# Patient Record
Sex: FEMALE | Race: WHITE | Employment: FULL TIME | URBAN - METROPOLITAN AREA
[De-identification: names, ages, dates, MRNs, and addresses within clinical notes are randomized per-mention and may not be internally consistent; named-entity substitution may affect disease eponyms.]

---

## 2020-03-10 ENCOUNTER — HOSPITAL ENCOUNTER (INPATIENT)
Age: 30
LOS: 2 days | Discharge: HOME | DRG: 885 | End: 2020-03-12
Attending: EMERGENCY MEDICINE | Admitting: PSYCHIATRY & NEUROLOGY
Payer: COMMERCIAL

## 2020-03-10 PROCEDURE — 6370000000 HC RX 637 (ALT 250 FOR IP): Performed by: NURSE PRACTITIONER

## 2020-03-10 PROCEDURE — 6370000000 HC RX 637 (ALT 250 FOR IP): Performed by: PSYCHIATRY & NEUROLOGY

## 2020-03-10 PROCEDURE — 1240000000 HC EMOTIONAL WELLNESS R&B

## 2020-03-10 PROCEDURE — 99285 EMERGENCY DEPT VISIT HI MDM: CPT

## 2020-03-10 RX ORDER — OMEGA-3S/DHA/EPA/FISH OIL/D3 300MG-1000
400 CAPSULE ORAL DAILY
COMMUNITY

## 2020-03-10 RX ORDER — ACETAMINOPHEN 325 MG/1
650 TABLET ORAL EVERY 4 HOURS PRN
Status: DISCONTINUED | OUTPATIENT
Start: 2020-03-10 | End: 2020-03-12 | Stop reason: HOSPADM

## 2020-03-10 RX ORDER — CETIRIZINE HYDROCHLORIDE 10 MG/1
10 TABLET ORAL DAILY PRN
Status: DISCONTINUED | OUTPATIENT
Start: 2020-03-10 | End: 2020-03-12 | Stop reason: HOSPADM

## 2020-03-10 RX ORDER — BENZTROPINE MESYLATE 1 MG/ML
2 INJECTION INTRAMUSCULAR; INTRAVENOUS 2 TIMES DAILY PRN
Status: DISCONTINUED | OUTPATIENT
Start: 2020-03-10 | End: 2020-03-12 | Stop reason: HOSPADM

## 2020-03-10 RX ORDER — MAGNESIUM HYDROXIDE/ALUMINUM HYDROXICE/SIMETHICONE 120; 1200; 1200 MG/30ML; MG/30ML; MG/30ML
30 SUSPENSION ORAL EVERY 6 HOURS PRN
Status: DISCONTINUED | OUTPATIENT
Start: 2020-03-10 | End: 2020-03-12 | Stop reason: HOSPADM

## 2020-03-10 RX ORDER — ALPRAZOLAM 0.5 MG/1
0.5 TABLET ORAL 3 TIMES DAILY PRN
Status: ON HOLD | COMMUNITY
End: 2020-03-12 | Stop reason: HOSPADM

## 2020-03-10 RX ORDER — HYDROXYZINE HYDROCHLORIDE 25 MG/1
25 TABLET, FILM COATED ORAL 3 TIMES DAILY PRN
Status: DISCONTINUED | OUTPATIENT
Start: 2020-03-10 | End: 2020-03-11

## 2020-03-10 RX ORDER — CETIRIZINE HYDROCHLORIDE 10 MG/1
10 TABLET ORAL DAILY PRN
COMMUNITY

## 2020-03-10 RX ORDER — TRAZODONE HYDROCHLORIDE 50 MG/1
50 TABLET ORAL NIGHTLY PRN
Status: DISCONTINUED | OUTPATIENT
Start: 2020-03-10 | End: 2020-03-12 | Stop reason: HOSPADM

## 2020-03-10 RX ORDER — OMEGA-3S/DHA/EPA/FISH OIL/D3 300MG-1000
400 CAPSULE ORAL DAILY
Status: DISCONTINUED | OUTPATIENT
Start: 2020-03-10 | End: 2020-03-12 | Stop reason: HOSPADM

## 2020-03-10 RX ORDER — LEVOTHYROXINE AND LIOTHYRONINE 19; 4.5 UG/1; UG/1
30 TABLET ORAL DAILY
COMMUNITY

## 2020-03-10 RX ORDER — ALPRAZOLAM 0.5 MG/1
0.5 TABLET ORAL ONCE
Status: COMPLETED | OUTPATIENT
Start: 2020-03-10 | End: 2020-03-10

## 2020-03-10 RX ORDER — LEVOTHYROXINE AND LIOTHYRONINE 19; 4.5 UG/1; UG/1
30 TABLET ORAL DAILY
Status: DISCONTINUED | OUTPATIENT
Start: 2020-03-10 | End: 2020-03-11 | Stop reason: ALTCHOICE

## 2020-03-10 RX ADMIN — HYDROXYZINE HYDROCHLORIDE 25 MG: 25 TABLET, FILM COATED ORAL at 20:04

## 2020-03-10 RX ADMIN — TRAZODONE HYDROCHLORIDE 50 MG: 50 TABLET ORAL at 21:45

## 2020-03-10 RX ADMIN — ALPRAZOLAM 0.5 MG: 0.5 TABLET ORAL at 21:23

## 2020-03-10 SDOH — HEALTH STABILITY: MENTAL HEALTH: HOW OFTEN DO YOU HAVE A DRINK CONTAINING ALCOHOL?: NEVER

## 2020-03-10 ASSESSMENT — SLEEP AND FATIGUE QUESTIONNAIRES
AVERAGE NUMBER OF SLEEP HOURS: 5
DIFFICULTY STAYING ASLEEP: YES
DIFFICULTY FALLING ASLEEP: YES
DO YOU USE A SLEEP AID: YES
SLEEP PATTERN: DIFFICULTY FALLING ASLEEP;DISTURBED/INTERRUPTED SLEEP;INSOMNIA;RESTLESSNESS
DIFFICULTY ARISING: NO
RESTFUL SLEEP: NO
DO YOU HAVE DIFFICULTY SLEEPING: YES

## 2020-03-10 ASSESSMENT — ENCOUNTER SYMPTOMS
RESPIRATORY NEGATIVE: 1
EYES NEGATIVE: 1
SHORTNESS OF BREATH: 0
BACK PAIN: 0
GASTROINTESTINAL NEGATIVE: 1
ABDOMINAL PAIN: 0

## 2020-03-10 ASSESSMENT — LIFESTYLE VARIABLES: HISTORY_ALCOHOL_USE: NO

## 2020-03-10 ASSESSMENT — PATIENT HEALTH QUESTIONNAIRE - PHQ9: SUM OF ALL RESPONSES TO PHQ QUESTIONS 1-9: 14

## 2020-03-10 NOTE — PROGRESS NOTES
Medication History completed:    New medications: cetirizine, vitamin D, armour thyroid    Medications discontinued: none    Changes to dosing:   Alprazolam changed to 0.5 mg three times daily as needed for anxiety    Stated allergies: The patient states her reaction to morphine is nausea and vomiting. Other pertinent information: Medications confirmed with Mona Darby.      Thank you,  Kumar Melendrez, PharmD, BCPS  [phone]

## 2020-03-10 NOTE — BH NOTE
`[facility]  Admission Note     Admission Type:   Admission Type: Voluntary    Reason for admission:  Reason for Admission: Patient had a lot of recent medication changes, states increase in anxiety, depression, and having suicidal thoughts. Was in to see Sin June NP as her outpatient provider who suggested inpatient hospitalization for stabilization.      PATIENT STRENGTHS:  Strengths: Employment, Positive Support, Medication Compliance, Social Skills    Patient Strengths and Limitations:  Limitations: Difficulty problem solving/relies on others to help solve problems    Addictive Behavior:   Addictive Behavior  In the past 3 months, have you felt or has someone told you that you have a problem with:  : None  Do you have a history of Chemical Use?: No  Do you have a history of Alcohol Use?: No  Do you have a history of Street Drug Abuse?: No  Histroy of Prescripton Drug Abuse?: No    Medical Problems:   Past Medical History:   Diagnosis Date    Anxiety     Depression     Gracie Barr infection     Hashimoto's disease     Mononucleosis     PTSD (post-traumatic stress disorder)        Status EXAM:  Status and Exam  Normal: No  Facial Expression: Worried  Affect: Blunt  Level of Consciousness: Alert  Mood:Normal: No  Mood: Anxious, Depressed  Motor Activity:Normal: Yes  Interview Behavior: Cooperative  Preception: [place] to Person, Danika Nasuti to Time, [place] to Place, [place] to Situation  Attention:Normal: Yes  Thought Content:Normal: No  Thought Content: Preoccupations  Hallucinations: None  Delusions: No  Memory:Normal: Yes  Insight and Judgment: No  Insight and Judgment: Poor Judgment, Poor Insight  Present Suicidal Ideation: No  Present Homicidal Ideation: No    Tobacco Screening:  Practical Counseling, on admission, marita X, if applicable and completed (first 3 are required if patient doesn't refuse):            ( )  Recognizing danger situations (included triggers and roadblocks) ( )  Coping skills (new ways to manage stress, exercise, relaxation techniques, changing routine, distraction)                                                           ( )  Basic information about quitting (benefits of quitting, techniques in how to quit, available resources  ( ) Referral for counseling faxed to [facility]                                           ( ) Patient refused counseling  (x ) Patient has not smoked in the last 30 days    Metabolic Screening:    No results found for: LABA1C    No results found for: CHOL  No results found for: TRIG  No results found for: HDL  No components found for: LDLCAL  No results found for: LABVLDL      Body mass index is 24.94 kg/m². BP Readings from Last 2 Encounters:   03/10/20 136/82           Pt admitted with followings belongings:        Valuables sent home withn/a. Valuables placed in safe in security envelope, number:  [id number]. Patient's home medications were sent to security. Patient oriented to surroundings and program expectations and copy of patient rights given. Received admission packet:  yes. Consents reviewed, signed all. Refused none. Patient verbalize understanding:  yes.     Patient education on precautions: done                   Gigi Henao RN

## 2020-03-10 NOTE — ED PROVIDER NOTES
EMERGENCY DEPARTMENT ENCOUNTER    Pt Name: Gregory Erickson  MRN: [id number]  [place] 1990  Date of evaluation: 3/10/20  CHIEF COMPLAINT     No chief complaint on file. HISTORY OF PRESENT ILLNESS   The pt presents for mental health evaluation. Pt is depressed. Recently multiple med adjustments that do not seem to be working. [facility] for [facility]. No si/hi. No medical complaints. The history is provided by the patient. Mental Health Problem   Presenting symptoms: depression    Degree of incapacity (severity): Moderate  Onset quality:  Gradual  Timing:  Constant  Progression:  Worsening  Chronicity:  Chronic  Context: recent medication change    Treatment compliance: All of the time  Relieved by:  Nothing  Worsened by:  Nothing  Ineffective treatments:  Antidepressants and anti-anxiety medications  Associated symptoms: no abdominal pain, no chest pain and no headaches        REVIEW OF SYSTEMS     Review of Systems   Constitutional: Negative. Negative for chills and fever. HENT: Negative. Negative for congestion. Eyes: Negative. Respiratory: Negative. Negative for shortness of breath. Cardiovascular: Negative. Negative for chest pain. Gastrointestinal: Negative. Negative for abdominal pain. Genitourinary: Negative. Musculoskeletal: Negative. Negative for back pain. Skin: Negative. Negative for rash. Neurological: Negative. Negative for headaches. All other systems reviewed and are negative.     PASTMEDICAL HISTORY     Past Medical History:   Diagnosis Date    Anxiety     Depression     Gracie Barr infection     Hashimoto's disease     Mononucleosis     PTSD (post-traumatic stress disorder)      SURGICAL HISTORY       Past Surgical History:   Procedure Laterality Date    ANTERIOR CRUCIATE LIGAMENT REPAIR Right     ACL repair x 2     CURRENT MEDICATIONS       Current Discharge Medication List      CONTINUE these medications which have NOT CHANGED Details   ALPRAZolam (XANAX) 0.5 MG tablet Take 0.5 mg by mouth 3 times daily as needed for Anxiety. sertraline (ZOLOFT) 50 MG tablet Take 50 mg by mouth daily      thyroid (ARMOUR) 30 MG tablet Take 30 mg by mouth daily      cetirizine (ZYRTEC) 10 MG tablet Take 10 mg by mouth daily as needed for Allergies      vitamin D3 (CHOLECALCIFEROL) 10 MCG (400 UNIT) TABS tablet Take 400 Units by mouth daily           ALLERGIES     is allergic to gluten meal and morphine. FAMILY HISTORY     has no family status information on file. SOCIAL HISTORY       Social History     Tobacco Use    Smoking status: Never Smoker    Smokeless tobacco: Never Used   Substance Use Topics    Alcohol use: Never     Frequency: Never    Drug use: Never     PHYSICAL EXAM     INITIAL VITALS: /70   Pulse 99   Temp 98.3 °F (36.8 °C) (Oral)   Resp 14   Ht 5' 8\" (1.727 m)   Wt 164 lb (74.4 kg)   LMP 02/21/2020   SpO2 98%   BMI 24.94 kg/m²    Physical Exam  Vitals signs and nursing note reviewed. Constitutional:       Appearance: Normal appearance. HENT:      Head: Normocephalic and atraumatic. Nose: No congestion. Mouth/Throat:      Mouth: Mucous membranes are moist.   Eyes:      Conjunctiva/sclera: Conjunctivae normal.   Neck:      Musculoskeletal: Normal range of motion and neck supple. Cardiovascular:      Rate and Rhythm: Normal rate and regular rhythm. Heart sounds: No murmur. Pulmonary:      Effort: Pulmonary effort is normal.      Breath sounds: Normal breath sounds. Abdominal:      Palpations: Abdomen is soft. Tenderness: There is no abdominal tenderness. Musculoskeletal:         General: No signs of injury. Skin:     General: Skin is warm and dry. Capillary Refill: Capillary refill takes less than 2 seconds. Findings: No rash. Neurological:      General: No focal deficit present. Mental Status: She is alert and oriented to person, place, and time.    Psychiatric: tablet 25 mg    ALPRAZolam (XANAX) tablet 0.5 mg     CONSULTS:  IP CONSULT TO HOSPITALIST  IP CONSULT TO SPIRITUAL SERVICES    FINAL IMPRESSION      1. Depression, unspecified depression type          DISPOSITION/PLAN   DISPOSITION Admitted 03/10/2020 05:04:04 PM      PATIENT REFERRED TO:  No follow-up provider specified.   DISCHARGE MEDICATIONS:  Current Discharge Medication List        Paul Kaplan MD  Attending Emergency Physician                    Patrick Wagner MD  03/10/20 2086

## 2020-03-10 NOTE — ED NOTES
Pt comes to this ER with c/o recent S/I's but not today, an increase in anxiety, and an increase in her depression. Pt reports a recent medication adjustment that seemed to increase her s/s's instead of decreasing/stabilizing her. Pt arrives A+O x 4, GCS = 15, PMS x 4 intact, eupneic, and PWD. Pulse is regular et strong with s1 and s2 heart tones. Lung sounds clear t/o bilat. Pt is calm et cooperative. Pt is having appropriate conversation with this nurse.      Fei Iglesias RN  03/10/20 [id number]

## 2020-03-11 VITALS
TEMPERATURE: 97.9 F | BODY MASS INDEX: 24.86 KG/M2 | DIASTOLIC BLOOD PRESSURE: 69 MMHG | HEART RATE: 77 BPM | WEIGHT: 164 LBS | HEIGHT: 68 IN | RESPIRATION RATE: 14 BRPM | SYSTOLIC BLOOD PRESSURE: 110 MMHG | OXYGEN SATURATION: 98 %

## 2020-03-11 PROBLEM — F41.1 GENERALIZED ANXIETY DISORDER: Status: ACTIVE | Noted: 2020-03-11

## 2020-03-11 PROBLEM — F33.2 SEVERE RECURRENT MAJOR DEPRESSION WITHOUT PSYCHOTIC FEATURES (HCC): Status: ACTIVE | Noted: 2020-03-11

## 2020-03-11 PROBLEM — F43.10 PTSD (POST-TRAUMATIC STRESS DISORDER): Status: ACTIVE | Noted: 2020-03-11

## 2020-03-11 LAB
ABSOLUTE EOS #: 0.1 K/UL (ref 0–0.4)
ABSOLUTE IMMATURE GRANULOCYTE: ABNORMAL K/UL (ref 0–0.3)
ABSOLUTE LYMPH #: 2.1 K/UL (ref 1–4.8)
ABSOLUTE MONO #: 0.8 K/UL (ref 0.1–1.3)
ALBUMIN SERPL-MCNC: 4.8 G/DL (ref 3.5–5.2)
ALBUMIN/GLOBULIN RATIO: ABNORMAL (ref 1–2.5)
ALP BLD-CCNC: 46 U/L (ref 35–104)
ALT SERPL-CCNC: 8 U/L (ref 5–33)
ANION GAP SERPL CALCULATED.3IONS-SCNC: 14 MMOL/L (ref 9–17)
AST SERPL-CCNC: 10 U/L
BASOPHILS # BLD: 0 % (ref 0–2)
BASOPHILS ABSOLUTE: 0 K/UL (ref 0–0.2)
BILIRUB SERPL-MCNC: 0.35 MG/DL (ref 0.3–1.2)
BUN BLDV-MCNC: 9 MG/DL (ref 6–20)
BUN/CREAT BLD: ABNORMAL (ref 9–20)
CALCIUM SERPL-MCNC: 9.9 MG/DL (ref 8.6–10.4)
CHLORIDE BLD-SCNC: 100 MMOL/L (ref 98–107)
CHOLESTEROL/HDL RATIO: 1.9
CHOLESTEROL: 115 MG/DL
CO2: 24 MMOL/L (ref 20–31)
CREAT SERPL-MCNC: 0.79 MG/DL (ref 0.5–0.9)
DIFFERENTIAL TYPE: ABNORMAL
EOSINOPHILS RELATIVE PERCENT: 1 % (ref 0–4)
ESTIMATED AVERAGE GLUCOSE: 97 MG/DL
GFR AFRICAN AMERICAN: >60 ML/MIN
GFR NON-AFRICAN AMERICAN: >60 ML/MIN
GFR SERPL CREATININE-BSD FRML MDRD: ABNORMAL ML/MIN/{1.73_M2}
GFR SERPL CREATININE-BSD FRML MDRD: ABNORMAL ML/MIN/{1.73_M2}
GLUCOSE BLD-MCNC: 125 MG/DL (ref 70–99)
HBA1C MFR BLD: 5 % (ref 4–6)
HCT VFR BLD CALC: 42.7 % (ref 36–46)
HDLC SERPL-MCNC: 59 MG/DL
HEMOGLOBIN: 14.5 G/DL (ref 12–16)
IMMATURE GRANULOCYTES: ABNORMAL %
LDL CHOLESTEROL: 47 MG/DL (ref 0–130)
LYMPHOCYTES # BLD: 20 % (ref 24–44)
MCH RBC QN AUTO: 30.2 PG (ref 26–34)
MCHC RBC AUTO-ENTMCNC: 33.9 G/DL (ref 31–37)
MCV RBC AUTO: 89.1 FL (ref 80–100)
MONOCYTES # BLD: 7 % (ref 1–7)
NRBC AUTOMATED: ABNORMAL PER 100 WBC
PDW BLD-RTO: 13 % (ref 11.5–14.9)
PLATELET # BLD: 360 K/UL (ref 150–450)
PLATELET ESTIMATE: ABNORMAL
PMV BLD AUTO: 7.3 FL (ref 6–12)
POTASSIUM SERPL-SCNC: 3.8 MMOL/L (ref 3.7–5.3)
RBC # BLD: 4.79 M/UL (ref 4–5.2)
RBC # BLD: ABNORMAL 10*6/UL
SEG NEUTROPHILS: 72 % (ref 36–66)
SEGMENTED NEUTROPHILS ABSOLUTE COUNT: 7.7 K/UL (ref 1.3–9.1)
SODIUM BLD-SCNC: 138 MMOL/L (ref 135–144)
TOTAL PROTEIN: 7.7 G/DL (ref 6.4–8.3)
TRIGL SERPL-MCNC: 44 MG/DL
TSH SERPL DL<=0.05 MIU/L-ACNC: 1.32 MIU/L (ref 0.3–5)
VLDLC SERPL CALC-MCNC: NORMAL MG/DL (ref 1–30)
WBC # BLD: 10.7 K/UL (ref 3.5–11)
WBC # BLD: ABNORMAL 10*3/UL

## 2020-03-11 PROCEDURE — 83036 HEMOGLOBIN GLYCOSYLATED A1C: CPT

## 2020-03-11 PROCEDURE — 90792 PSYCH DIAG EVAL W/MED SRVCS: CPT | Performed by: NURSE PRACTITIONER

## 2020-03-11 PROCEDURE — 6370000000 HC RX 637 (ALT 250 FOR IP): Performed by: PSYCHIATRY & NEUROLOGY

## 2020-03-11 PROCEDURE — 36415 COLL VENOUS BLD VENIPUNCTURE: CPT

## 2020-03-11 PROCEDURE — 84443 ASSAY THYROID STIM HORMONE: CPT

## 2020-03-11 PROCEDURE — 80053 COMPREHEN METABOLIC PANEL: CPT

## 2020-03-11 PROCEDURE — 1240000000 HC EMOTIONAL WELLNESS R&B

## 2020-03-11 PROCEDURE — 85025 COMPLETE CBC W/AUTO DIFF WBC: CPT

## 2020-03-11 PROCEDURE — 80061 LIPID PANEL: CPT

## 2020-03-11 PROCEDURE — 6370000000 HC RX 637 (ALT 250 FOR IP): Performed by: NURSE PRACTITIONER

## 2020-03-11 RX ORDER — BUSPIRONE HYDROCHLORIDE 5 MG/1
5 TABLET ORAL 3 TIMES DAILY
Status: DISCONTINUED | OUTPATIENT
Start: 2020-03-11 | End: 2020-03-12 | Stop reason: HOSPADM

## 2020-03-11 RX ORDER — HYDROXYZINE 50 MG/1
50 TABLET, FILM COATED ORAL 3 TIMES DAILY PRN
Status: DISCONTINUED | OUTPATIENT
Start: 2020-03-11 | End: 2020-03-12 | Stop reason: HOSPADM

## 2020-03-11 RX ORDER — ALPRAZOLAM 0.5 MG/1
0.5 TABLET ORAL 2 TIMES DAILY PRN
Status: DISCONTINUED | OUTPATIENT
Start: 2020-03-11 | End: 2020-03-12 | Stop reason: HOSPADM

## 2020-03-11 RX ORDER — SERTRALINE HYDROCHLORIDE 100 MG/1
100 TABLET, FILM COATED ORAL DAILY
Status: DISCONTINUED | OUTPATIENT
Start: 2020-03-12 | End: 2020-03-12 | Stop reason: HOSPADM

## 2020-03-11 RX ORDER — LEVOTHYROXINE AND LIOTHYRONINE 19; 4.5 UG/1; UG/1
60 TABLET ORAL DAILY
Status: DISCONTINUED | OUTPATIENT
Start: 2020-03-11 | End: 2020-03-12 | Stop reason: HOSPADM

## 2020-03-11 RX ADMIN — SERTRALINE HYDROCHLORIDE 50 MG: 50 TABLET ORAL at 14:36

## 2020-03-11 RX ADMIN — HYDROXYZINE HYDROCHLORIDE 25 MG: 25 TABLET, FILM COATED ORAL at 02:01

## 2020-03-11 RX ADMIN — LEVOTHYROXINE AND LIOTHYRONINE 60 MG: 19; 4.5 TABLET ORAL at 09:54

## 2020-03-11 RX ADMIN — HYDROXYZINE HYDROCHLORIDE 25 MG: 25 TABLET, FILM COATED ORAL at 08:58

## 2020-03-11 RX ADMIN — TRAZODONE HYDROCHLORIDE 50 MG: 50 TABLET ORAL at 20:36

## 2020-03-11 RX ADMIN — HYDROXYZINE HYDROCHLORIDE 25 MG: 25 TABLET, FILM COATED ORAL at 12:47

## 2020-03-11 RX ADMIN — ALPRAZOLAM 0.5 MG: 0.5 TABLET ORAL at 14:36

## 2020-03-11 RX ADMIN — BUSPIRONE HYDROCHLORIDE 5 MG: 5 TABLET ORAL at 20:36

## 2020-03-11 RX ADMIN — ALPRAZOLAM 0.5 MG: 0.5 TABLET ORAL at 23:36

## 2020-03-11 RX ADMIN — SERTRALINE HYDROCHLORIDE 50 MG: 50 TABLET ORAL at 08:58

## 2020-03-11 RX ADMIN — HYDROXYZINE HYDROCHLORIDE 50 MG: 50 TABLET, FILM COATED ORAL at 20:36

## 2020-03-11 ASSESSMENT — LIFESTYLE VARIABLES: HISTORY_ALCOHOL_USE: NO

## 2020-03-11 NOTE — CARE COORDINATION
Social work attempted to meet with patient to complete psychosocial assessment, patient was meeting with nurse practitioner at this time, social work will attempt again later this day.

## 2020-03-11 NOTE — GROUP NOTE
Group Therapy Note    Date: 3/11/2020    Group Start Time: 0845  Group End Time: 2145  Group Topic: Community Meeting    WONG KAYLI ZEPEDA    [place], [place]    Patient's Goal:  To increase interpersonal interaction. Notes:  Pt attended and participated in group. Status After Intervention:  Improved    Participation Level:  Active Listener and Interactive    Participation Quality: Appropriate and Attentive      Speech:  normal      Thought Process/Content: Logical      Affective Functioning: Congruent      Mood: euthymic      Level of consciousness:  Alert, Oriented x4 and Attentive      Response to Learning: Able to verbalize current knowledge/experience, Able to retain information and Progressing to goal      Endings: None Reported    Modes of Intervention: Education, Support, Socialization, Exploration, Problem-solving and Reality-testing      Discipline Responsible: Psychoeducational Specialist      Signature:  Jessica Kim
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
## 2020-03-11 NOTE — PLAN OF CARE
Problem: Depressive Behavior With or Without Suicide Precautions:  Goal: Able to verbalize and/or display a decrease in depressive symptoms  Description: Able to verbalize and/or display a decrease in depressive symptoms  Note: Patient was accepting of 1:1 meet with RN. PT was accepting of PM program/group. Patient admitted to both anxiety and depression during PM shift. Further 1:1 was offered and patient was accepting of this. Patient expressed being on the verge of a panic attack, related her being on this psych unit. Patient expressed that she wants and needs a med change however she did not know that this unit was where she was going. Patient attempted to socialize, read, use 1:1 to help with stress however she reported having a hard time managing. Patient was accepting of PO atarax, but shortly after expressed that it was not helpful. RN reached out to on-call NP related to this issue, and a one time order for xanax . 5 PO was ordered. Patient verbalized knowledge of said medications and was accepting of them. A short time later patient expressed improvement in her mood, requesting trazodone for sleep. Shortly after PRN trazodone, patient was resting/sleeping quietly in her room, no further issues expressed/noted. Patient remains free from any self harm, falls, or any other type of injury as of this time. Patient agreed to seek out health care staff if stressors or any other issues were to become too much. Safety checks have been maintained every 15 minutes and at irregular intervals throughout the shift. Problem: Depressive Behavior With or Without Suicide Precautions:  Goal: Able to verbalize support systems  Description: Able to verbalize support systems  Note: Patient was accepting of 1:1 meet with RN. PT was accepting of PM program/group. Patient admitted to both anxiety and depression during PM shift. Further 1:1 was offered and patient was accepting of this.   Patient expressed being on the verge of a panic attack, related her being on this psych unit. Patient expressed that she wants and needs a med change however she did not know that this unit was where she was going. Patient attempted to socialize, read, use 1:1 to help with stress however she reported having a hard time managing. Patient was accepting of PO atarax, but shortly after expressed that it was not helpful. RN reached out to on-call NP related to this issue, and a one time order for xanax . 5 PO was ordered. Patient verbalized knowledge of said medications and was accepting of them. A short time later patient expressed improvement in her mood, requesting trazodone for sleep. Shortly after PRN trazodone, patient was resting/sleeping quietly in her room, no further issues expressed/noted. Patient remains free from any self harm, falls, or any other type of injury as of this time. Patient agreed to seek out health care staff if stressors or any other issues were to become too much. Safety checks have been maintained every 15 minutes and at irregular intervals throughout the shift.

## 2020-03-11 NOTE — PLAN OF CARE
Problem: Depressive Behavior With or Without Suicide Precautions:  Goal: Able to verbalize and/or display a decrease in depressive symptoms  Description: Able to verbalize and/or display a decrease in depressive symptoms  3/11/2020 1411 by Sade Rutherford RN  Outcome: Ongoing  Note: Patient is reporting depression at this time. Patient is reporting extreme anxiety and that she is constantly feeling like she is going to have a panic attack due to being away from her mom.      Goal: Able to verbalize support systems  Description: Able to verbalize support systems  3/11/2020 1411 by Sade Rutherford RN  Outcome: Ongoing  Note: Patient states her mom is her support

## 2020-03-11 NOTE — GROUP NOTE
Group Therapy Note    Date: 3/11/2020    Group Start Time: 1430  Group End Time: 0455  Group Topic: Recovery    [facility] DANYA Brewer, ORQUIDEA        Group Therapy Note    Attendees: 6/15    Patient's Goal:  Increase understanding of addiction and the recovery process. Notes:  Pt is making progress AEB participating in group discussion about identifying addiction and appropriate treatment. Status After Intervention:  Improved    Participation Level:  Active Listener and Interactive    Participation Quality: Appropriate, Attentive and Supportive      Speech:  normal      Thought Process/Content: Logical      Affective Functioning: Congruent      Mood: euthymic      Level of consciousness:  Alert, Oriented x4 and Attentive      Response to Learning: Progressing to goal      Endings: None Reported    Modes of Intervention: Education, Support, Socialization, Exploration, Clarifying and Problem-solving      Discipline Responsible: /Counselor      Signature:  DANYA Causey LSW, [place]

## 2020-03-11 NOTE — H&P
HISTORY and Vic[facility] [address]47       NAME:  Mary Payan  MRN: [id number]   YOB: 1990   Date: 3/11/2020   Age: 26 y.o. Gender: female     COMPLAINT AND PRESENT HISTORY:    Mary Payan is a 26 y.o.  female, admitted because of increasing depression and anxiety. Patient admits to being depressed for the last couple weeks and states that she has been on some recent multiple medication adjustments that did not seem to be working for her. Patient states she was sent by [facility]. Patient mentions that she was on Cymbalta and was taken off and developed  panic attacks and tried to get back on. Patient is denying any suicidal ideations or no suicide attempts in the past.  She denies any visual or auditory hallucinations. Patient states that her sleep has been disrupted and she is really not eating well. Patient states her denies any alcohol or substance abuse. Patient states she has her own place. Patient denies any somatic complaints. No chest pain or shortness of breath. DIAGNOSTIC RESULTS     PAST MEDICAL HISTORY     Past Medical History:   Diagnosis Date    Anxiety     Depression     Gracie Barr infection     Hashimoto's disease     Mononucleosis     PTSD (post-traumatic stress disorder)      Pt denies any history of Diabetes mellitus type 2, hypertension, stroke, heart disease, COPD, Asthma, GERD, HLD, Cancer, Seizures,Thyroid disease, Kidney Disease, Hepatitis, TB.    SURGICAL HISTORY       Past Surgical History:   Procedure Laterality Date    ANTERIOR CRUCIATE LIGAMENT REPAIR Right     ACL repair x 2       FAMILY HISTORY     History reviewed. No pertinent family history.     SOCIAL HISTORY       Social History     Socioeconomic History    Marital status: Single     Spouse name: None    Number of children: None    Years of education: None    Highest education level: None   Occupational History    None   Social Needs    Financial pain, palpitation, shortness of breath, coughing or expectoration. Gastrointestinal tract: No abdominal pain, nausea, vomiting, dysphagia, diarrhea or constipation. Genitourinary:  No burning on micturition. No hesitancy, urgency, frequency or discoloration of urine. Locomotor:  No bone or joint pains. No swelling or deformities. Neuropsychiatric:  See HPI. GENERAL PHYSICAL EXAM:     Vitals: /70   Pulse 99   Temp 98.3 °F (36.8 °C) (Oral)   Resp 14   Ht 5' 8\" (1.727 m)   Wt 164 lb (74.4 kg)   LMP 02/21/2020   SpO2 98%   BMI 24.94 kg/m²  Body mass index is 24.94 kg/m². Pt was examined with a nurse present in the room. GENERAL APPEARANCE:  Joseluis Bond is 26 y.o.,  female, mildly obese, nourished, conscious, alert. Does not appear to be distress or pain at this time. SKIN:  Warm, dry, no cyanosis or jaundice. HEAD:  Normocephalic, atraumatic, no swelling or tenderness. EYES:  Pupils equal, reactive to light, Conjunctiva is clear, EOMs intact [place]. eyelids WNL. EARS:  No discharge, no marked hearing loss. NOSE:  No rhinorrhea, epistaxis or septal deformity. THROAT:  Not congested. No ulceration bleeding or discharge. NECK:  No stiffness, trachea central.  No palpable masses or L.N.      CHEST:  Symmetrical and equal on expansion. HEART:  Regular rate and rhythm. S1 > S2, No audible murmurs or gallops. LUNGS:  Equal on expansion, normal breath sounds. No adventitious sounds. ABDOMEN:  Mildly obese. Soft on palpation. No localized tenderness. No guarding or rigidity. No palpable organomegaly. LYMPHATICS:  No palpable cervical Lymphadenopathy. LOCOMOTOR, BACK AND SPINE:  No tenderness or deformities. EXTREMITIES:  Symmetrical, no pretibial edema. Jays sign negative. No discoloration or ulcerations.     NEUROLOGIC:  The patient is conscious, alert, oriented,Cranial nerve II-XII intact, taste and smell were not examined. No apparent focal sensory or motor deficits. Muscle strength equal Leon. No facial droop, tongue protrudes centrally, no slurring of the speech. PROVISIONAL DIAGNOSES:      Active Problems:    * No active hospital problems. *  Resolved Problems:    * No resolved hospital problems.  *      ARRON VACA, MAHIN - CNP on 3/11/2020 at 7:23 AM

## 2020-03-11 NOTE — CARE COORDINATION
[facility] Biopsychosocial Assessment    Current Level of Psychosocial Functioning     Independent: xx  Dependent:    Minimal Assist:       Psychosocial High Risk Factors (check all that apply)    Unable to obtain meds:    Chronic illness/pain:     Substance abuse:    Lack of Family Support: xx  Financial stress:    Isolation:    Inadequate Community Resources:    Suicide attempt(s):    Not taking medications:     Victim of crime:    Developmental Delay:    Unable to manage personal needs:    Age 72 or older:    Homeless:    No transportation:    Readmission within 30 days:    Unemployment:    Traumatic Event: xx    Psychiatric Advanced Directives: None reported    Family to Involve in Treatment: None reported    Sexual Orientation: JOSEPH    Patient Strengths: Patient is linked with psychiatry services    Patient Barriers: Patient has not attended trauma therapy to process    Opiate Education: Patient denies opiate use    CMHC/mental health history: Patient has been seeing Dr Cata Campos, also went to [facility] for second opinion, unsure where she will follow up    Plan of Care   medication management, group/individual therapies, family meetings, psycho -education, treatment team meetings to assist with stabilization    Initial Discharge Plan: Patient to stabilize with medication, return to community for outpatient services      Clinical Summary:      Pt is a 26year old  female who presented to the [facility] with reported increase in anxiety and depressive symptoms. Patient denies any suicide attempts. Patient reports having suicidal ideation with no plans, reports \"it's more like intrusive thoughts. \" Patient denies any past self harm. Patient denies homicidal ideation, denies auditory and visual hallucinations. Patient reports she has been seeing Dr Cata Campos and was having medication changes, and went to [facility] for a second opinion which led to patient being hospitalized.  Patient reports she is not sure where she will

## 2020-03-11 NOTE — BH NOTE
PT reported that she cannot take armour, and has to take NP thyroid medication in which she has in her bag to use if needed. RN will pass this to day shift.

## 2020-03-12 PROBLEM — F60.89 CLUSTER B PERSONALITY DISORDER (HCC): Status: ACTIVE | Noted: 2020-03-12

## 2020-03-12 PROCEDURE — 6370000000 HC RX 637 (ALT 250 FOR IP): Performed by: NURSE PRACTITIONER

## 2020-03-12 PROCEDURE — 5130000000 HC BRIDGE APPOINTMENT

## 2020-03-12 PROCEDURE — 99239 HOSP IP/OBS DSCHRG MGMT >30: CPT | Performed by: NURSE PRACTITIONER

## 2020-03-12 RX ORDER — BUSPIRONE HYDROCHLORIDE 5 MG/1
5 TABLET ORAL 3 TIMES DAILY
Qty: 42 TABLET | Refills: 0 | Status: SHIPPED | OUTPATIENT
Start: 2020-03-12 | End: 2020-03-26

## 2020-03-12 RX ORDER — ALPRAZOLAM 0.5 MG/1
0.5 TABLET ORAL 2 TIMES DAILY PRN
Qty: 10 TABLET | Refills: 0
Start: 2020-03-12 | End: 2020-04-11

## 2020-03-12 RX ORDER — SERTRALINE HYDROCHLORIDE 100 MG/1
100 TABLET, FILM COATED ORAL DAILY
Qty: 14 TABLET | Refills: 0 | Status: SHIPPED | OUTPATIENT
Start: 2020-03-13 | End: 2020-03-27

## 2020-03-12 RX ADMIN — ALPRAZOLAM 0.5 MG: 0.5 TABLET ORAL at 12:20

## 2020-03-12 RX ADMIN — BUSPIRONE HYDROCHLORIDE 5 MG: 5 TABLET ORAL at 10:02

## 2020-03-12 RX ADMIN — SERTRALINE HYDROCHLORIDE 100 MG: 100 TABLET ORAL at 10:02

## 2020-03-12 RX ADMIN — CHOLECALCIFEROL TAB 10 MCG (400 UNIT) 400 UNITS: 10 TAB at 10:03

## 2020-03-12 RX ADMIN — BUSPIRONE HYDROCHLORIDE 5 MG: 5 TABLET ORAL at 15:06

## 2020-03-12 RX ADMIN — LEVOTHYROXINE AND LIOTHYRONINE 30 MG: 19; 4.5 TABLET ORAL at 10:02

## 2020-03-12 NOTE — GROUP NOTE
Group Therapy Note    Date: 3/12/2020    Group Start Time: 1100  Group End Time: 1130  Group Topic: Psychoeducation    ELISAB[facility]    Patient's Goal:  To increase interpersonal interaction     Notes:  PT attended and participated in group. Status After Intervention:  Improved    Participation Level:  Active Listener and Interactive    Participation Quality: Appropriate and Attentive      Speech:  normal      Thought Process/Content: Logical      Affective Functioning: Congruent      Mood: euthymic      Level of consciousness:  Alert and Attentive      Response to Learning: Able to verbalize current knowledge/experience, and Progressing to goal      Endings: None Reported    Modes of Intervention: Education, Socialization, Exploration, Media, Problem-solving and Reality-testing      Discipline Responsible: Psychoeducational Specialist      Signature:  Lalita Klein

## 2020-03-12 NOTE — CARE COORDINATION
Bridge Appointment completed: Reviewed Discharge Instructions with patient. Patient verbalizes understanding and agreement with the discharge plan using the teachback method.        Discharge Arrangements: [address] notified: N/A  Discharge destination/address: home address per face sheet  Transported by:  family

## 2020-03-12 NOTE — GROUP NOTE
Group Therapy Note    Date: 3/12/2020    Group Start Time: 1500  Group End Time: 4896  Group Topic: Cognitive Skills    STC[facility] BHBRANDON G    Sharon Siddiqui [place], [place]          Patient's Goal:  To increase interpersonal interaction     Notes:  PT attended and participated in group. Status After Intervention:  Improved    Participation Level:  Active Listener and Interactive    Participation Quality: Appropriate and Attentive      Speech:  normal      Thought Process/Content: Logical      Affective Functioning: Congruent      Mood: euthymic      Level of consciousness:  Alert and Attentive      Response to Learning:  and Progressing to goal      Endings: None Reported    Modes of Intervention: Socialization, Exploration, Media, Problem-solving and Reality-testing      Discipline Responsible: Psychoeducational Specialist      Signature:  Severo Vences

## 2020-03-12 NOTE — GROUP NOTE
Group Therapy Note    Date: 3/12/2020    Group Start Time: 0845  Group End Time: 3579  Group Topic: Community Meeting    ELISABETH Luo  [place]    Patient's Goal:  To increase interpersonal interaction     Notes:  PT attended and participated in group. Status After Intervention:  Improved    Participation Level:  Active Listener and Interactive    Participation Quality: Appropriate and Attentive      Speech:  normal      Thought Process/Content: Logical      Affective Functioning: Congruent      Mood: euthymic      Level of consciousness:  Alert and Attentive      Response to Learning: Able to verbalize current knowledge/experience, Able to retain information and Progressing to goal      Endings: None Reported    Modes of Intervention: Education, Support, Socialization, Exploration, Problem-solving and Reality-testing      Discipline Responsible: Psychoeducational Specialist      Signature:  Patrice Kaiser

## 2020-03-12 NOTE — GROUP NOTE
Group Therapy Note    Date: 3/11/2020    Group Start Time: 2030  Group End Time: 2100  Group Topic: Group Therapy    [facility] Dominik Vasquez RN        Group Therapy Note    Attendees: 14/14    PSYCHOEDUCATION GROUP NOTE    Date: 3/11/2020  Start Time: 2030  End Time: 2100    Number Participants in Group:  14/14    Goal:  Patient will demonstrate increased interpersonal interaction   Topic: safety-group    Participation Level:     None  Minimal   x Active Listener x Interactive    Monopolizing         Participation Quality:  x Appropriate  Inappropriate   x       Attentive        Intrusive   x       Sharing        Resistant   x       Supportive        Lethargic         Modes of Intervention:  x Education x Support  Exploration   x Clarifying x Problem Solving  Confrontation   x Socialization x Limit Setting  Reality Testing   x Activity  Movement  Media    Other:                  Comments:             Signature:  Jonathan Bailey RN

## 2020-03-12 NOTE — PLAN OF CARE
Problem: Depressive Behavior With or Without Suicide Precautions:  Goal: Able to verbalize and/or display a decrease in depressive symptoms  Description: Able to verbalize and/or display a decrease in depressive symptoms  3/11/2020 2313 by Mary Almaraz RN  Outcome: Ongoing  Pt stated that she has been having a lot of \"panic attacks\". She said that she is focusing on trying to get sleep because she had not gotten good sleep in awhile. She was medication compliant. She was seclusive to room most of the evening. Problem: Depressive Behavior With or Without Suicide Precautions:  Goal: Able to verbalize support systems  Description: Able to verbalize support systems  3/11/2020 2313 by Mary Granados RN  Outcome: Ongoing  She stated that her family is supportive.

## 2020-03-12 NOTE — PLAN OF CARE
[address] [place] [address]  Day 3 Interdisciplinary Treatment Plan NOTE    Review Date & Time: 3/12/2020 9:52am    Admission Type:   Admission Type: Voluntary    Reason for admission:  Reason for Admission: Patient had a lot of recent medication changes, states increase in anxiety, depression, and having suicidal thoughts. Was in to see Maxine Souza NP as her outpatient provider who suggested inpatient hospitalization for stabilization.    Estimated Length of Stay: 5-7 days  Estimated Discharge Date Update: to be determined by physician    PATIENT STRENGTHS:  Patient Strengths Strengths: Connection to output provider, Employment, Social Skills  Patient Strengths and Limitations:Limitations: Difficulty problem solving/relies on others to help solve problems  Addictive Behavior:Addictive Behavior  In the past 3 months, have you felt or has someone told you that you have a problem with:  : None  Do you have a history of Chemical Use?: No  Do you have a history of Alcohol Use?: No  Do you have a history of Street Drug Abuse?: No  Histroy of Prescripton Drug Abuse?: No  Medical Problems:  Past Medical History:   Diagnosis Date    Anxiety     Depression     Gracie Carrillo infection     Hashimoto's disease     Mononucleosis     PTSD (post-traumatic stress disorder)        Risk:  Fall RiskTotal: 77  Joaquin Scale Joaquin Scale Score: 22  BVC Total: 0  Change in scores no Changes to plan of Care no    Status EXAM:   Status and Exam  Normal: No  Facial Expression: Worried  Affect: Blunt  Level of Consciousness: Alert  Mood:Normal: No  Mood: Anxious, Depressed  Motor Activity:Normal: Yes  Interview Behavior: Cooperative  Preception: [place] to Person, [place] to Time, [place] to Place, [place] to Situation  Attention:Normal: Yes  Thought Content:Normal: No  Thought Content: Preoccupations  Hallucinations: None  Delusions: No  Memory:Normal: Yes  Insight and Judgment: No  Insight and Judgment: Poor Insight, Poor Judgment  Present Suicidal

## 2020-03-12 NOTE — DISCHARGE SUMMARY
twenty four hours. She feels comfortable and safe discharging home and states that she has an adequate support system in place. Denies SE's from meds. It was decided that pt had achieved maximum benefit from hospital care and can be discharged. Consults: Internal medicine    Significant Diagnostic Studies: Routine labs and diagnostics    Treatments: Psychotropic medications, therapy with group, milieu, and 1:1 with nurses, social workers, PADAKOTAH/CNP, and Attending physician. Discharge Medications:  Current Discharge Medication List      START taking these medications    Details   busPIRone (BUSPAR) 5 MG tablet Take 1 tablet by mouth 3 times daily for 14 days  Qty: 42 tablet, Refills: 0         CONTINUE these medications which have CHANGED    Details   ALPRAZolam (XANAX) 0.5 MG tablet Take 1 tablet by mouth 2 times daily as needed for Anxiety for up to 30 days.   Qty: 10 tablet, Refills: 0    Associated Diagnoses: Generalized anxiety disorder      sertraline (ZOLOFT) 100 MG tablet Take 1 tablet by mouth daily for 14 days  Qty: 14 tablet, Refills: 0         CONTINUE these medications which have NOT CHANGED    Details   thyroid (ARMOUR) 30 MG tablet Take 30 mg by mouth daily      cetirizine (ZYRTEC) 10 MG tablet Take 10 mg by mouth daily as needed for Allergies      vitamin D3 (CHOLECALCIFEROL) 10 MCG (400 UNIT) TABS tablet Take 400 Units by mouth daily              Core Measures statement:   Not applicable    Discharge Exam:  Level of consciousness:  Within normal limits  Appearance: Street clothes, seated, with good grooming  Behavior/Motor: No abnormalities noted  Attitude toward examiner:  Cooperative, attentive, good eye contact  Speech:  spontaneous, normal rate, normal volume and well articulated  Mood:  euthymic  Affect:  mood congruent  Thought processes:  linear, goal directed and coherent  Thought content:  Homocidal ideation denies  Suicidal Ideation:  denies suicidal ideation  Delusions:  no

## 2020-03-12 NOTE — BH NOTE
[facility]  Discharge Note    Pt discharged with followings belongings:   Dentures: None  Vision - Corrective Lenses: None  Hearing Aid: None  Jewelry: None  Body Piercings Removed: N/A  Clothing: Footwear, Jacket / coat, Pants, Shirt, Socks, Undergarments (Comment), Pajamas  Were All Patient Medications Collected?: Yes  Other Valuables: Cell phone, Money (Comment), [address], [facility], [address] sent home with patient 5. Valuables retrieved from safe, Security envelope number:  [id number]. and returned to patient. Patient education on aftercare instructions: yes  Information faxed to [facility] by RN Patient verbalize understanding of AVS:  yes.     Status EXAM upon discharge:  Status and Exam  Normal: Yes  Facial Expression: Brightened  Affect: Appropriate  Level of Consciousness: Alert  Mood:Normal: No  Mood: Anxious  Motor Activity:Normal: Yes  Interview Behavior: Cooperative  Preception: [place] to Person, Danika Nasuti to Time, [place] to Place, [place] to Situation  Attention:Normal: Yes  Thought Content:Normal: Yes  Thought Content: Preoccupations  Hallucinations: None  Delusions: No  Memory:Normal: Yes  Insight and Judgment: No  Insight and Judgment: Poor Judgment, Poor Insight  Present Suicidal Ideation: No  Present Homicidal Ideation: No      Metabolic Screening:    Lab Results   Component Value Date    LABA1C 5.0 03/11/2020       Lab Results   Component Value Date    CHOL 115 03/11/2020     Lab Results   Component Value Date    TRIG 44 03/11/2020     Lab Results   Component Value Date    HDL 59 03/11/2020     No components found for: LDLCAL  No results found for: Kassandra Cook RN

## 2020-03-12 NOTE — GROUP NOTE
Group Therapy Note    Date: 3/12/2020    Group Start Time: 1600  Group End Time: 36  Group Topic: Music Therapy    [facility] G    Maki Coughlin LPN        Group Therapy Note    Attendees: 9/14         Patient's Goal:  Discharge     Notes:      Status After Intervention:  Improved    Participation Level:  Active Listener    Participation Quality: Appropriate      Speech:  Normal       Thought Process/Content: Logical      Affective Functioning: Congruent      Mood: calm       Level of consciousness:  Alert      Response to Learning: Able to verbalize current knowledge/experience      Endings: None Reported    Modes of Intervention: Media      Discipline Responsible: Licensed Practical Nurse      Signature:  Maki Coughlin LPN

## 2020-03-13 ENCOUNTER — FOLLOWUP TELEPHONE ENCOUNTER (OUTPATIENT)
Dept: PSYCHIATRY | Age: 30
End: 2020-03-13
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
## 2020-03-16 NOTE — CARE COORDINATION
Name: Michelle Osborne    : 1990    Discharge Date: 3/12/2020    Primary Auth/Cert #: [id number]    Pt was discharged home. Discharge Medications:      Medication List      START taking these medications    busPIRone 5 MG tablet  Commonly known as:  BUSPAR  Take 1 tablet by mouth 3 times daily for 14 days  Notes to patient:  anxiety        CHANGE how you take these medications    ALPRAZolam 0.5 MG tablet  Commonly known as:  XANAX  Take 1 tablet by mouth 2 times daily as needed for Anxiety for up to 30 days.   What changed:  when to take this  Notes to patient:  anxiety     sertraline 100 MG tablet  Commonly known as:  ZOLOFT  Take 1 tablet by mouth daily for 14 days  What changed:    · medication strength  · how much to take  Notes to patient:  Decrease depression        CONTINUE taking these medications    cetirizine 10 MG tablet  Commonly known as:  ZYRTEC  Notes to patient:  allergies     thyroid 30 MG tablet  Commonly known as:  ARMOUR  Notes to patient:  Thyroid supplement     vitamin D3 10 MCG (400 UNIT) Tabs tablet  Commonly known as:  CHOLECALCIFEROL  Notes to patient:  Dietary supplement           Where to Get Your Medications      These medications were sent to [address] [place] [address], [address] [facility] [phone]  [address] [facility], [address] [place] [address]    Phone:  [phone]   · busPIRone 5 MG tablet  · sertraline 100 MG tablet     Information about where to get these medications is not yet available    Ask your nurse or doctor about these medications  · ALPRAZolam 0.5 MG tablet         Follow Up Appointment: [place] [id number], [facility].  [phone]  Go on 3/13/2020  1:00pm with [address] [facility]   [address]  [place], [address]  [phone]    They will reach out to you by phone ([phone]) sometime tomorrow to coordinate meeting